# Patient Record
Sex: MALE | Race: WHITE | NOT HISPANIC OR LATINO
[De-identification: names, ages, dates, MRNs, and addresses within clinical notes are randomized per-mention and may not be internally consistent; named-entity substitution may affect disease eponyms.]

---
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
## 2020-04-01 ENCOUNTER — APPOINTMENT (OUTPATIENT)
Dept: OTOLARYNGOLOGY | Facility: CLINIC | Age: 20
End: 2020-04-01

## 2020-04-22 ENCOUNTER — APPOINTMENT (OUTPATIENT)
Dept: OTOLARYNGOLOGY | Facility: CLINIC | Age: 20
End: 2020-04-22
Payer: COMMERCIAL

## 2020-04-22 PROCEDURE — 99213 OFFICE O/P EST LOW 20 MIN: CPT | Mod: 25

## 2020-04-22 PROCEDURE — 69220 CLEAN OUT MASTOID CAVITY: CPT | Mod: LT

## 2020-05-12 NOTE — PHYSICAL EXAM
[Binocular Microscopic Exam] : Binocular microscopic exam was performed [Midline] : trachea located in midline position [Normal] : mucosa is normal [FreeTextEntry8] : copious cerumen removed [de-identified] : left canal  down mastoidectomy with cerumen removed using alligator, curette & suction. No evidence of infection or cholesteatoma

## 2020-05-12 NOTE — PROCEDURE
[] : Debridement of Mastoid [Same] : same as the Pre Op Dx. [FreeTextEntry1] : left CWD [FreeTextEntry4] : none [FreeTextEntry6] : Canal wall down mastoid debrided under microscope using suction, alligator and curette.  Patient tolerated procedure well.

## 2020-05-12 NOTE — HISTORY OF PRESENT ILLNESS
[de-identified] : 19M here for 6 month f/u for hearing. Pt with h/o left CWD - had bloody drainage mid-December- used Ciprodex drops which resolved the issue- here for cleaning. Pt denies otalgia, otorrhea, hearing loss, tinnitus, dizziness, vertigo or headaches related to hearing. Doing well academically- no issues.

## 2020-10-26 ENCOUNTER — APPOINTMENT (OUTPATIENT)
Dept: OTOLARYNGOLOGY | Facility: CLINIC | Age: 20
End: 2020-10-26
Payer: COMMERCIAL

## 2020-10-26 PROCEDURE — 69220 CLEAN OUT MASTOID CAVITY: CPT | Mod: LT

## 2020-10-26 PROCEDURE — 99214 OFFICE O/P EST MOD 30 MIN: CPT | Mod: 25

## 2020-10-26 PROCEDURE — 99072 ADDL SUPL MATRL&STAF TM PHE: CPT

## 2020-10-26 PROCEDURE — 92557 COMPREHENSIVE HEARING TEST: CPT

## 2020-10-26 PROCEDURE — 92567 TYMPANOMETRY: CPT

## 2020-10-26 NOTE — HISTORY OF PRESENT ILLNESS
[de-identified] : 20M here for 6 month f/u for HL- h/o left CWD - feels hearing is worsened in the left ear- denies otalgia, otorrhea, ear infections. Doing well academically- mostly online learning- goes in person 1x/3weeks.

## 2020-10-26 NOTE — PHYSICAL EXAM
[Binocular Microscopic Exam] : Binocular microscopic exam was performed [Normal] : mucosa is normal [Midline] : trachea located in midline position [FreeTextEntry8] : copious cerumen removed [FreeTextEntry9] : CWD - debrided for wax - sprayed with mastoid powder [de-identified] : left canal  down mastoidectomy with cerumen removed using alligator, curette & suction. No evidence of infection or cholesteatoma

## 2020-10-26 NOTE — PROCEDURE
[Same] : same as the Pre Op Dx. [] : Debridement of Mastoid [FreeTextEntry1] : left CWD [FreeTextEntry6] : Canal wall down mastoid debrided under microscope using suction, alligator and curette.  Patient tolerated procedure well. Sprayed with mastoid powder

## 2020-10-31 ENCOUNTER — OUTPATIENT (OUTPATIENT)
Dept: OUTPATIENT SERVICES | Facility: HOSPITAL | Age: 20
LOS: 1 days | End: 2020-10-31
Payer: COMMERCIAL

## 2020-10-31 ENCOUNTER — APPOINTMENT (OUTPATIENT)
Dept: CT IMAGING | Facility: CLINIC | Age: 20
End: 2020-10-31
Payer: COMMERCIAL

## 2020-10-31 DIAGNOSIS — H90.2 CONDUCTIVE HEARING LOSS, UNSPECIFIED: ICD-10-CM

## 2020-10-31 DIAGNOSIS — H70.10 CHRONIC MASTOIDITIS, UNSPECIFIED EAR: ICD-10-CM

## 2020-10-31 PROCEDURE — 70480 CT ORBIT/EAR/FOSSA W/O DYE: CPT | Mod: 26

## 2020-10-31 PROCEDURE — 70480 CT ORBIT/EAR/FOSSA W/O DYE: CPT

## 2020-11-06 ENCOUNTER — NON-APPOINTMENT (OUTPATIENT)
Age: 20
End: 2020-11-06

## 2020-11-09 ENCOUNTER — APPOINTMENT (OUTPATIENT)
Dept: OTOLARYNGOLOGY | Facility: CLINIC | Age: 20
End: 2020-11-09
Payer: COMMERCIAL

## 2020-11-09 PROCEDURE — 99213 OFFICE O/P EST LOW 20 MIN: CPT

## 2020-11-09 PROCEDURE — 99072 ADDL SUPL MATRL&STAF TM PHE: CPT

## 2020-11-12 NOTE — HISTORY OF PRESENT ILLNESS
[de-identified] : 20M f/u for HL and to discuss CT IAC results- h/o left CWD- mom aware that CT scan shows prosthesis is in the correct location- to discuss middle ear exploration-

## 2020-11-12 NOTE — PHYSICAL EXAM
[Normal] : mucosa is normal [Midline] : trachea located in midline position [de-identified] : left CWD clean right normal

## 2020-12-02 ENCOUNTER — APPOINTMENT (OUTPATIENT)
Dept: OTOLARYNGOLOGY | Facility: CLINIC | Age: 20
End: 2020-12-02

## 2020-12-02 ENCOUNTER — OUTPATIENT (OUTPATIENT)
Dept: OUTPATIENT SERVICES | Facility: HOSPITAL | Age: 20
LOS: 1 days | End: 2020-12-02

## 2020-12-02 VITALS
OXYGEN SATURATION: 99 % | RESPIRATION RATE: 14 BRPM | DIASTOLIC BLOOD PRESSURE: 57 MMHG | HEIGHT: 70 IN | TEMPERATURE: 97 F | WEIGHT: 154.1 LBS | HEART RATE: 59 BPM | SYSTOLIC BLOOD PRESSURE: 115 MMHG

## 2020-12-02 DIAGNOSIS — H70.10 CHRONIC MASTOIDITIS, UNSPECIFIED EAR: ICD-10-CM

## 2020-12-02 DIAGNOSIS — Z86.69 PERSONAL HISTORY OF OTHER DISEASES OF THE NERVOUS SYSTEM AND SENSE ORGANS: ICD-10-CM

## 2020-12-02 NOTE — H&P PST ADULT - ASSESSMENT
19 yo male with no significant PMH presents to PST unit with pre-op diagnosis of chronic mastoiditis, conductive hearing loss scheduled for left osscicular chain reconstruction fascia graft and facial nerve monitoring with Dr. Buckner.

## 2020-12-02 NOTE — H&P PST ADULT - NSICDXPROBLEM_GEN_ALL_CORE_FT
PROBLEM DIAGNOSES  Problem: H/O cholesteatoma  Assessment and Plan:  scheduled for left osscicular chain reconstruction fascia graft and facial nerve monitoring with Dr. Buckner 12/08/2020.  Verbal and written pre-op instructions provided to patient. Patient verbalized understanding and will call surgeons office for revised instructions if surgery is rescheduled.   Pepcid for GI prophylaxis provided.   Patient aware of need for COVID testing prior to  procedure and advised to coordinate with surgeon.   Patient will obtain medical clearance as per surgeons request

## 2020-12-02 NOTE — H&P PST ADULT - HISTORY OF PRESENT ILLNESS
21 yo male with no significant PMH presents to PST unit with pre-op diagnosis of chronic mastoiditis, conductive hearing loss scheduled for left oscicular chain reconstruction fascia graft and facial nerve monitoring with Dr. Buckner.

## 2020-12-02 NOTE — H&P PST ADULT - NSICDXPASTSURGICALHX_GEN_ALL_CORE_FT
PAST SURGICAL HISTORY:  Cholesteatoma microscopic tympanomastoid for cholesteatoma    Circumcision     Hypertrophy of tonsils and adenoids T&A 2006 @ [facility]

## 2020-12-02 NOTE — H&P PST ADULT - ENMT COMMENTS
Hearing intact on right, 50 % hearing loss in left ear rt pre-op diagnosis of chronic mastoiditis, conductive hearing loss pre-op diagnosis chronic mastoiditis, conductive hearing loss

## 2020-12-02 NOTE — H&P PST ADULT - RS GEN PE MLT RESP DETAILS PC
clear to auscultation bilaterally/respirations non-labored/no wheezes/airway patent/breath sounds equal/good air movement

## 2020-12-05 ENCOUNTER — APPOINTMENT (OUTPATIENT)
Dept: DISASTER EMERGENCY | Facility: CLINIC | Age: 20
End: 2020-12-05

## 2020-12-05 DIAGNOSIS — Z01.818 ENCOUNTER FOR OTHER PREPROCEDURAL EXAMINATION: ICD-10-CM

## 2020-12-06 LAB — SARS-COV-2 N GENE NPH QL NAA+PROBE: NOT DETECTED

## 2020-12-07 ENCOUNTER — TRANSCRIPTION ENCOUNTER (OUTPATIENT)
Age: 20
End: 2020-12-07

## 2020-12-07 VITALS
WEIGHT: 154.1 LBS | HEART RATE: 72 BPM | TEMPERATURE: 98 F | DIASTOLIC BLOOD PRESSURE: 48 MMHG | SYSTOLIC BLOOD PRESSURE: 95 MMHG

## 2020-12-08 ENCOUNTER — RESULT REVIEW (OUTPATIENT)
Age: 20
End: 2020-12-08

## 2020-12-08 ENCOUNTER — OUTPATIENT (OUTPATIENT)
Dept: OUTPATIENT SERVICES | Facility: HOSPITAL | Age: 20
LOS: 1 days | Discharge: HOME | End: 2020-12-08
Payer: COMMERCIAL

## 2020-12-08 ENCOUNTER — APPOINTMENT (OUTPATIENT)
Dept: OTOLARYNGOLOGY | Facility: HOSPITAL | Age: 20
End: 2020-12-08

## 2020-12-08 VITALS
RESPIRATION RATE: 17 BRPM | OXYGEN SATURATION: 100 % | HEART RATE: 69 BPM | DIASTOLIC BLOOD PRESSURE: 81 MMHG | SYSTOLIC BLOOD PRESSURE: 109 MMHG

## 2020-12-08 DIAGNOSIS — H70.10 CHRONIC MASTOIDITIS, UNSPECIFIED EAR: ICD-10-CM

## 2020-12-08 PROCEDURE — 92516 FACIAL NERVE FUNCTION TEST: CPT

## 2020-12-08 PROCEDURE — 15769 GRFG AUTOL SOFT TISS DIR EXC: CPT

## 2020-12-08 PROCEDURE — 69633 REBUILD EARDRUM STRUCTURES: CPT | Mod: LT

## 2020-12-08 PROCEDURE — 15770 DERMA-FAT-FASCIA GRAFT: CPT

## 2020-12-08 PROCEDURE — 88300 SURGICAL PATH GROSS: CPT | Mod: 26

## 2020-12-08 RX ORDER — ACETAMINOPHEN WITH CODEINE 300MG-30MG
1 TABLET ORAL
Qty: 15 | Refills: 0
Start: 2020-12-08 | End: 2020-12-10

## 2020-12-08 RX ORDER — CEFDINIR 250 MG/5ML
1 POWDER, FOR SUSPENSION ORAL
Qty: 14 | Refills: 0
Start: 2020-12-08 | End: 2020-12-14

## 2020-12-08 RX ORDER — SODIUM CHLORIDE 9 MG/ML
1000 INJECTION, SOLUTION INTRAVENOUS
Refills: 0 | Status: DISCONTINUED | OUTPATIENT
Start: 2020-12-08 | End: 2020-12-08

## 2020-12-08 NOTE — ASU DISCHARGE PLAN (ADULT/PEDIATRIC) - ASU DC SPECIAL INSTRUCTIONSFT
please follow the instructions on the patient printed handout    please take omnicef x  1 week as directed (antibiotics)  may take tylenol #3 1 tab every 4 to 6 hours as needed for pain please follow the instructions on the patient printed handout- from Dr. Buckner    please take omnicef x  1 week as directed (antibiotics)  may take tylenol #3 1 tab every 4 to 6 hours as needed for pain

## 2020-12-08 NOTE — ASU DISCHARGE PLAN (ADULT/PEDIATRIC) - CARE PROVIDER_API CALL
Elkin Buckner)  Otolaryngology  [address] [place] [address]  Phone: [phone]  Fax: [phone]  Follow Up Time:

## 2020-12-08 NOTE — BRIEF OPERATIVE NOTE - NSICDXBRIEFPROCEDURE_GEN_ALL_CORE_FT
PROCEDURES:  Left tympanotomy with reconstruction of ossicular chain 08-Dec-2020 09:59:35  David Norris

## 2020-12-08 NOTE — ASU DISCHARGE PLAN (ADULT/PEDIATRIC) - CALL YOUR DOCTOR IF YOU HAVE ANY OF THE FOLLOWING:
Swelling that gets worse/Fever greater than (need to indicate Fahrenheit or Celsius)/Bleeding that does not stop/Wound/Surgical Site with redness, or foul smelling discharge or pus/Nausea and vomiting that does not stop/Inability to tolerate liquids or foods/Pain not relieved by Medications

## 2020-12-08 NOTE — BRIEF OPERATIVE NOTE - NSICDXBRIEFPOSTOP_GEN_ALL_CORE_FT
POST-OP DIAGNOSIS:  Conductive hearing loss in left ear 08-Dec-2020 09:59:53  David Norris   Statement Selected

## 2020-12-16 ENCOUNTER — APPOINTMENT (OUTPATIENT)
Dept: OTOLARYNGOLOGY | Facility: CLINIC | Age: 20
End: 2020-12-16
Payer: COMMERCIAL

## 2020-12-16 PROCEDURE — 99024 POSTOP FOLLOW-UP VISIT: CPT

## 2020-12-21 LAB — SURGICAL PATHOLOGY STUDY: SIGNIFICANT CHANGE UP

## 2020-12-28 NOTE — HISTORY OF PRESENT ILLNESS
[de-identified] : 20 yr old returns for post- op visit for left OCR done 12/08/2020- no c/o pain or vertigo now (had some mild dizziness first 2 days post-op)-  feels hearing is improved already in that ear

## 2020-12-28 NOTE — REASON FOR VISIT
[Hearing Loss] : hearing loss [Post-Operative Visit] : a post-operative visit [FreeTextEntry2] : s/p left OCR

## 2021-01-13 ENCOUNTER — APPOINTMENT (OUTPATIENT)
Dept: OTOLARYNGOLOGY | Facility: CLINIC | Age: 21
End: 2021-01-13
Payer: COMMERCIAL

## 2021-01-13 PROCEDURE — 99024 POSTOP FOLLOW-UP VISIT: CPT

## 2021-01-13 PROCEDURE — 92557 COMPREHENSIVE HEARING TEST: CPT

## 2021-02-01 NOTE — DATA REVIEWED
[de-identified] : Right ear: Hearing is WNL from 250Hz-8kHz\par Left ear: Hearing is WNL from 250Hz-1kHz with a mild to moderately-severe CHL thereafter. Conductive components noted at 500Hz and 1kHz\par DNT tymps as per Dr. Buckner

## 2021-02-01 NOTE — HISTORY OF PRESENT ILLNESS
[de-identified] : 20M f/u for post- op visit for left OCR done 12/08/2020- using otic drops as directed

## 2021-04-14 ENCOUNTER — APPOINTMENT (OUTPATIENT)
Dept: OTOLARYNGOLOGY | Facility: CLINIC | Age: 21
End: 2021-04-14
Payer: COMMERCIAL

## 2021-04-14 DIAGNOSIS — H71.22 CHOLESTEATOMA OF MASTOID, LEFT EAR: ICD-10-CM

## 2021-04-14 PROCEDURE — 69220 CLEAN OUT MASTOID CAVITY: CPT | Mod: LT

## 2021-04-14 PROCEDURE — 99213 OFFICE O/P EST LOW 20 MIN: CPT | Mod: 25

## 2021-04-14 PROCEDURE — 99072 ADDL SUPL MATRL&STAF TM PHE: CPT

## 2021-04-14 RX ORDER — AMOXICILLIN AND CLAVULANATE POTASSIUM 875; 125 MG/1; MG/1
875-125 TABLET, COATED ORAL
Qty: 20 | Refills: 0 | Status: DISCONTINUED | COMMUNITY
Start: 2020-12-21 | End: 2021-04-14

## 2021-04-14 RX ORDER — OFLOXACIN OTIC 3 MG/ML
0.3 SOLUTION AURICULAR (OTIC) TWICE DAILY
Qty: 2 | Refills: 0 | Status: DISCONTINUED | COMMUNITY
Start: 2020-12-16 | End: 2021-04-14

## 2021-04-23 NOTE — PHYSICAL EXAM
[Normal] : mucosa is normal [Midline] : trachea located in midline position [de-identified] : CWD cleaned - cerumen removed -LYNNE - TM thickened; wax rmeoved AD - TM normal

## 2021-04-23 NOTE — PROCEDURE
[] : Debridement of Mastoid [FreeTextEntry1] : TIMD AS [FreeTextEntry4] : none [FreeTextEntry6] : Canal wall down mastoid debrided under microscope using suction, alligator and curette.  LYNNE, no infeciton or cholesteatoma . Patient tolerated procedure well.

## 2021-04-23 NOTE — HISTORY OF PRESENT ILLNESS
[de-identified] : 20M f/u for hearing- hx of left OCR done 12/08/2020- pt denies otalgia, otorrhea, ear infections, hearing loss, tinnitus, dizziness, vertigo or headaches related to hearing. Pt notes some wax may need to be removed. Doing well academically- sophomore in college.

## 2021-08-11 ENCOUNTER — APPOINTMENT (OUTPATIENT)
Dept: OTOLARYNGOLOGY | Facility: CLINIC | Age: 21
End: 2021-08-11
Payer: COMMERCIAL

## 2021-08-11 PROCEDURE — G0268 REMOVAL OF IMPACTED WAX MD: CPT

## 2021-08-11 PROCEDURE — 92567 TYMPANOMETRY: CPT

## 2021-08-11 PROCEDURE — 99213 OFFICE O/P EST LOW 20 MIN: CPT | Mod: 25

## 2021-08-11 PROCEDURE — 92557 COMPREHENSIVE HEARING TEST: CPT

## 2021-08-11 RX ORDER — DAPSONE 50 MG/G
5 GEL TOPICAL
Refills: 0 | Status: ACTIVE | COMMUNITY

## 2021-09-01 NOTE — DATA REVIEWED
[de-identified] : Hearing WNL, AD\par Mild to moderate-severe mixed loss, AS\par Type Ad tymp, AD\par Type A tymp with large ECV, AS

## 2021-09-01 NOTE — PHYSICAL EXAM
[Normal] : mucosa is normal [Midline] : trachea located in midline position [de-identified] : CWD cleaned - cerumen removed -LYNNE - TM thickened; wax removed AD - TM normal

## 2022-03-28 ENCOUNTER — APPOINTMENT (OUTPATIENT)
Dept: OTOLARYNGOLOGY | Facility: CLINIC | Age: 22
End: 2022-03-28
Payer: COMMERCIAL

## 2022-03-28 VITALS
SYSTOLIC BLOOD PRESSURE: 107 MMHG | HEIGHT: 70 IN | HEART RATE: 60 BPM | WEIGHT: 170 LBS | DIASTOLIC BLOOD PRESSURE: 61 MMHG | BODY MASS INDEX: 24.34 KG/M2

## 2022-03-28 PROCEDURE — 99212 OFFICE O/P EST SF 10 MIN: CPT | Mod: 25

## 2022-03-28 PROCEDURE — 69220 CLEAN OUT MASTOID CAVITY: CPT | Mod: LT

## 2022-03-28 RX ORDER — CIPROFLOXACIN AND DEXAMETHASONE 3; 1 MG/ML; MG/ML
0.3-0.1 SUSPENSION/ DROPS AURICULAR (OTIC)
Qty: 1 | Refills: 3 | Status: ACTIVE | COMMUNITY
Start: 2022-03-28 | End: 1900-01-01

## 2022-04-21 NOTE — HISTORY OF PRESENT ILLNESS
[de-identified] : 21 year old male follow up for left ear hearing loss.  S/p  left OCR done 12/08/2020.  States about 1-2 weeks ago started having green discharge.  Denies otalgia, ear infections. \par

## 2022-04-21 NOTE — REASON FOR VISIT
[Subsequent Evaluation] : a subsequent evaluation for [FreeTextEntry2] : follow up left ear hearing loss

## 2022-04-21 NOTE — PROCEDURE
[] : Debridement of Mastoid [FreeTextEntry1] : left CWD [FreeTextEntry6] : Canal wall down mastoid debrided under microscope using suction, alligator and curette.  Patient tolerated procedure well.  - massive wax removed - sprayed with mastoid powder

## 2022-04-21 NOTE — PHYSICAL EXAM
[Normal] : mucosa is normal [Midline] : trachea located in midline position [de-identified] : CWD cleaned - cerumen removed -LYNNE - TM thickened; wax removed AD - TM normal

## 2022-10-06 ENCOUNTER — APPOINTMENT (OUTPATIENT)
Dept: OTOLARYNGOLOGY | Facility: CLINIC | Age: 22
End: 2022-10-06

## 2022-10-06 VITALS
HEIGHT: 70 IN | HEART RATE: 64 BPM | BODY MASS INDEX: 24.34 KG/M2 | SYSTOLIC BLOOD PRESSURE: 111 MMHG | WEIGHT: 170 LBS | DIASTOLIC BLOOD PRESSURE: 72 MMHG

## 2022-10-06 DIAGNOSIS — H92.12 OTORRHEA, LEFT EAR: ICD-10-CM

## 2022-10-06 PROCEDURE — 99213 OFFICE O/P EST LOW 20 MIN: CPT | Mod: 25

## 2022-10-06 PROCEDURE — 69220 CLEAN OUT MASTOID CAVITY: CPT | Mod: LT

## 2022-11-06 NOTE — HISTORY OF PRESENT ILLNESS
[de-identified] : 22 year old man, 6 month follow up for Left cholesteatoma. History of chronic mastoiditis, bilateral cerumen impaction, CHL - s/p Left OCR 12/08/2020.  Started on Ciprodex drops at the time - using as needed with relief for otorrhea.  No hearing changes. Denies otalgia, itching, fullness, dizziness, vertigo, headaches related to ears, recent fevers or ear infections.

## 2022-11-06 NOTE — PHYSICAL EXAM
[Normal] : mucosa is normal [Midline] : trachea located in midline position [de-identified] : CWD cleaned - cerumen removed -LYNNE - TM thickened; wax removed AD - TM normal

## 2022-11-06 NOTE — PROCEDURE
[Same] : same as the Pre Op Dx. [] : Debridement of Mastoid [FreeTextEntry1] : left CWD [FreeTextEntry6] : Canal wall down mastoid debrided under microscope using suction, alligator and curette.  Patient tolerated procedure well.\par

## 2023-01-18 ENCOUNTER — APPOINTMENT (OUTPATIENT)
Dept: OTOLARYNGOLOGY | Facility: CLINIC | Age: 23
End: 2023-01-18
Payer: COMMERCIAL

## 2023-01-18 VITALS
HEIGHT: 70 IN | DIASTOLIC BLOOD PRESSURE: 70 MMHG | SYSTOLIC BLOOD PRESSURE: 102 MMHG | WEIGHT: 170 LBS | BODY MASS INDEX: 24.34 KG/M2 | TEMPERATURE: 58 F | HEART RATE: 58 BPM

## 2023-01-18 PROCEDURE — 92567 TYMPANOMETRY: CPT

## 2023-01-18 PROCEDURE — 92557 COMPREHENSIVE HEARING TEST: CPT

## 2023-01-18 PROCEDURE — 69220 CLEAN OUT MASTOID CAVITY: CPT | Mod: LT

## 2023-01-18 PROCEDURE — 99213 OFFICE O/P EST LOW 20 MIN: CPT | Mod: 25

## 2023-02-01 NOTE — DATA REVIEWED
[de-identified] : Hearing WNL, AD\par Essentially mild to moderate-severe CHL, AS\par Type Ad tymp, AD\par Type B tymp with large ECV, AS

## 2023-02-01 NOTE — HISTORY OF PRESENT ILLNESS
[de-identified] : 22 year old man, 3 month follow up for left cholesteatoma. History of chronic mastoiditis, bilateral cerumen impaction, CHL - s/p Left OCR 12/08/2020. Continuing with Ciprodex drops as needed. Denies otalgia, changes in hearing, itching, fullness, dizziness, vertigo, headaches related to ears, recent fevers or ear infections.

## 2023-02-01 NOTE — PHYSICAL EXAM
[Normal] : mucosa is normal [Midline] : trachea located in midline position [de-identified] : CWD cleaned - cerumen removed -LYNNE - TM thickened; AD - TM normal

## 2023-07-19 ENCOUNTER — APPOINTMENT (OUTPATIENT)
Dept: OTOLARYNGOLOGY | Facility: CLINIC | Age: 23
End: 2023-07-19
Payer: COMMERCIAL

## 2023-07-19 VITALS
BODY MASS INDEX: 26.48 KG/M2 | WEIGHT: 185 LBS | DIASTOLIC BLOOD PRESSURE: 74 MMHG | HEIGHT: 70 IN | HEART RATE: 60 BPM | SYSTOLIC BLOOD PRESSURE: 114 MMHG

## 2023-07-19 DIAGNOSIS — H69.80 OTHER SPECIFIED DISORDERS OF EUSTACHIAN TUBE, UNSPECIFIED EAR: ICD-10-CM

## 2023-07-19 DIAGNOSIS — H90.2 CONDUCTIVE HEARING LOSS, UNSPECIFIED: ICD-10-CM

## 2023-07-19 PROCEDURE — 69220 CLEAN OUT MASTOID CAVITY: CPT | Mod: LT

## 2023-07-19 PROCEDURE — 99213 OFFICE O/P EST LOW 20 MIN: CPT | Mod: 25

## 2023-08-08 NOTE — HISTORY OF PRESENT ILLNESS
[de-identified] : 23 year old male following up for left cholesteatoma. History of chronic mastoiditis, bilateral cerumen impaction, CHL - s/p Left OCR 12/08/2020. States hearing has been stable since last visit. Only using Cipro-dex PRN. No otorrhea or otalgia. Patient denies otalgia, otorrhea, ear infections, hearing loss, tinnitus, dizziness, vertigo, headaches related to hearing.\par

## 2023-08-08 NOTE — PROCEDURE
[Same] : same as the Pre Op Dx. [] : Debridement of Mastoid [FreeTextEntry1] : left CWD [FreeTextEntry4] : none [FreeTextEntry6] : Canal wall down mastoid debrided AS under microscope using suction, alligator and curette.  Patient tolerated procedure well.

## 2023-08-08 NOTE — PHYSICAL EXAM
[de-identified] : CWD cleaned - cerumen removed -LYNNE - TM thickened; AD - TM normal  [Midline] : trachea located in midline position [Normal] : orientation to person, place, and time: normal

## 2024-02-21 ENCOUNTER — APPOINTMENT (OUTPATIENT)
Dept: OTOLARYNGOLOGY | Facility: CLINIC | Age: 24
End: 2024-02-21
Payer: COMMERCIAL

## 2024-02-21 VITALS
BODY MASS INDEX: 27.2 KG/M2 | WEIGHT: 190 LBS | HEIGHT: 70 IN | SYSTOLIC BLOOD PRESSURE: 115 MMHG | DIASTOLIC BLOOD PRESSURE: 64 MMHG

## 2024-02-21 DIAGNOSIS — H61.20 IMPACTED CERUMEN, UNSPECIFIED EAR: ICD-10-CM

## 2024-02-21 DIAGNOSIS — H70.10 CHRONIC MASTOIDITIS, UNSPECIFIED EAR: ICD-10-CM

## 2024-02-21 PROCEDURE — 69220 CLEAN OUT MASTOID CAVITY: CPT | Mod: LT

## 2024-02-21 PROCEDURE — 99213 OFFICE O/P EST LOW 20 MIN: CPT | Mod: 25

## 2024-02-22 NOTE — PHYSICAL EXAM
[de-identified] : CWD cleaned - cerumen removed -LYNNE - TM thickened; AD - wax removed TM normal  [Normal] : mucosa is normal [Midline] : trachea located in midline position

## 2024-02-22 NOTE — PROCEDURE
[Same] : same as the Pre Op Dx. [] : Debridement of Mastoid [FreeTextEntry1] : left CWD [FreeTextEntry4] : none [FreeTextEntry6] : Canal wall down mastoid debrided under microscope using alligator and curette.  Wax removed, no cholesteatoma  - sprayed with mastoid powder  - Patient tolerated procedure well.

## 2024-08-21 ENCOUNTER — APPOINTMENT (OUTPATIENT)
Dept: OTOLARYNGOLOGY | Facility: CLINIC | Age: 24
End: 2024-08-21

## 2024-08-21 VITALS
HEART RATE: 58 BPM | HEIGHT: 70 IN | DIASTOLIC BLOOD PRESSURE: 69 MMHG | WEIGHT: 185 LBS | BODY MASS INDEX: 26.48 KG/M2 | SYSTOLIC BLOOD PRESSURE: 116 MMHG

## 2024-08-21 PROCEDURE — 92557 COMPREHENSIVE HEARING TEST: CPT

## 2024-08-21 PROCEDURE — 69220 CLEAN OUT MASTOID CAVITY: CPT | Mod: LT

## 2024-08-21 PROCEDURE — 99213 OFFICE O/P EST LOW 20 MIN: CPT | Mod: 25

## 2024-08-21 PROCEDURE — 92567 TYMPANOMETRY: CPT

## 2024-08-21 NOTE — HISTORY OF PRESENT ILLNESS
[de-identified] : 23 yo M with hx of left CWD presents for 6 month follow up. No noticeable change in hearing. No tinnitus, otalgia, otorrhea, ear infections, dizziness or headaches related to hearing.

## 2024-08-21 NOTE — DATA REVIEWED
[de-identified] : Right ear: WNL with the exception of a mild SNHL at 500Hz Left ear : Mild to moderately-severe mixed HL  Type A tymp in right ear Type B tymp with large ECV

## 2024-12-18 ENCOUNTER — APPOINTMENT (OUTPATIENT)
Dept: OTOLARYNGOLOGY | Facility: CLINIC | Age: 24
End: 2024-12-18
Payer: COMMERCIAL

## 2024-12-18 VITALS
HEIGHT: 70 IN | HEART RATE: 57 BPM | SYSTOLIC BLOOD PRESSURE: 108 MMHG | DIASTOLIC BLOOD PRESSURE: 69 MMHG | BODY MASS INDEX: 26.48 KG/M2 | WEIGHT: 185 LBS

## 2024-12-18 DIAGNOSIS — H69.90 UNSPECIFIED EUSTACHIAN TUBE DISORDER, UNSPECIFIED EAR: ICD-10-CM

## 2024-12-18 DIAGNOSIS — H61.20 IMPACTED CERUMEN, UNSPECIFIED EAR: ICD-10-CM

## 2024-12-18 DIAGNOSIS — H90.2 CONDUCTIVE HEARING LOSS, UNSPECIFIED: ICD-10-CM

## 2024-12-18 DIAGNOSIS — H70.10 CHRONIC MASTOIDITIS, UNSPECIFIED EAR: ICD-10-CM

## 2024-12-18 PROCEDURE — 69220 CLEAN OUT MASTOID CAVITY: CPT | Mod: LT

## 2024-12-18 PROCEDURE — 99213 OFFICE O/P EST LOW 20 MIN: CPT | Mod: 25

## 2025-04-16 ENCOUNTER — APPOINTMENT (OUTPATIENT)
Dept: OTOLARYNGOLOGY | Facility: CLINIC | Age: 25
End: 2025-04-16

## 2025-04-16 VITALS
HEIGHT: 70 IN | DIASTOLIC BLOOD PRESSURE: 52 MMHG | SYSTOLIC BLOOD PRESSURE: 95 MMHG | BODY MASS INDEX: 26.05 KG/M2 | WEIGHT: 182 LBS | HEART RATE: 50 BPM

## 2025-04-16 PROCEDURE — 69220 CLEAN OUT MASTOID CAVITY: CPT | Mod: LT

## 2025-04-16 PROCEDURE — 99212 OFFICE O/P EST SF 10 MIN: CPT | Mod: 25
